# Patient Record
Sex: MALE | Race: WHITE | NOT HISPANIC OR LATINO | Employment: UNEMPLOYED | URBAN - METROPOLITAN AREA
[De-identification: names, ages, dates, MRNs, and addresses within clinical notes are randomized per-mention and may not be internally consistent; named-entity substitution may affect disease eponyms.]

---

## 2018-05-16 ENCOUNTER — RECORDS - HEALTHEAST (OUTPATIENT)
Dept: LAB | Facility: CLINIC | Age: 33
End: 2018-05-16

## 2018-05-19 LAB — BACTERIA SPEC CULT: NORMAL

## 2021-05-27 ENCOUNTER — RECORDS - HEALTHEAST (OUTPATIENT)
Dept: ADMINISTRATIVE | Facility: CLINIC | Age: 36
End: 2021-05-27

## 2021-06-16 PROBLEM — Z22.322 MRSA (METHICILLIN RESISTANT STAPH AUREUS) CULTURE POSITIVE: Status: ACTIVE | Noted: 2019-02-12

## 2022-04-30 ENCOUNTER — APPOINTMENT (OUTPATIENT)
Dept: CT IMAGING | Facility: HOSPITAL | Age: 37
End: 2022-04-30
Attending: EMERGENCY MEDICINE
Payer: COMMERCIAL

## 2022-04-30 ENCOUNTER — HOSPITAL ENCOUNTER (EMERGENCY)
Facility: HOSPITAL | Age: 37
Discharge: ANOTHER HOSPITAL | End: 2022-04-30
Attending: EMERGENCY MEDICINE | Admitting: EMERGENCY MEDICINE
Payer: COMMERCIAL

## 2022-04-30 VITALS
OXYGEN SATURATION: 96 % | RESPIRATION RATE: 16 BRPM | DIASTOLIC BLOOD PRESSURE: 86 MMHG | HEIGHT: 74 IN | WEIGHT: 225.09 LBS | HEART RATE: 68 BPM | SYSTOLIC BLOOD PRESSURE: 139 MMHG | TEMPERATURE: 98.1 F | BODY MASS INDEX: 28.89 KG/M2

## 2022-04-30 DIAGNOSIS — K04.7 DENTAL ABSCESS: ICD-10-CM

## 2022-04-30 DIAGNOSIS — L03.211 FACIAL CELLULITIS: ICD-10-CM

## 2022-04-30 LAB
ANION GAP SERPL CALCULATED.3IONS-SCNC: 11 MMOL/L (ref 5–18)
BASOPHILS # BLD AUTO: 0 10E3/UL (ref 0–0.2)
BASOPHILS NFR BLD AUTO: 0 %
BUN SERPL-MCNC: 6 MG/DL (ref 8–22)
CALCIUM SERPL-MCNC: 9.2 MG/DL (ref 8.5–10.5)
CHLORIDE BLD-SCNC: 103 MMOL/L (ref 98–107)
CO2 SERPL-SCNC: 26 MMOL/L (ref 22–31)
CREAT SERPL-MCNC: 0.9 MG/DL (ref 0.7–1.3)
EOSINOPHIL # BLD AUTO: 0.1 10E3/UL (ref 0–0.7)
EOSINOPHIL NFR BLD AUTO: 1 %
ERYTHROCYTE [DISTWIDTH] IN BLOOD BY AUTOMATED COUNT: 12.3 % (ref 10–15)
GFR SERPL CREATININE-BSD FRML MDRD: >90 ML/MIN/1.73M2
GLUCOSE BLD-MCNC: 155 MG/DL (ref 70–125)
HCT VFR BLD AUTO: 46.8 % (ref 40–53)
HGB BLD-MCNC: 15.6 G/DL (ref 13.3–17.7)
IMM GRANULOCYTES # BLD: 0 10E3/UL
IMM GRANULOCYTES NFR BLD: 0 %
LACTATE SERPL-SCNC: 1.3 MMOL/L (ref 0.7–2)
LYMPHOCYTES # BLD AUTO: 2.6 10E3/UL (ref 0.8–5.3)
LYMPHOCYTES NFR BLD AUTO: 28 %
MAGNESIUM SERPL-MCNC: 1.9 MG/DL (ref 1.8–2.6)
MCH RBC QN AUTO: 30.1 PG (ref 26.5–33)
MCHC RBC AUTO-ENTMCNC: 33.3 G/DL (ref 31.5–36.5)
MCV RBC AUTO: 90 FL (ref 78–100)
MONOCYTES # BLD AUTO: 0.8 10E3/UL (ref 0–1.3)
MONOCYTES NFR BLD AUTO: 8 %
NEUTROPHILS # BLD AUTO: 5.9 10E3/UL (ref 1.6–8.3)
NEUTROPHILS NFR BLD AUTO: 63 %
NRBC # BLD AUTO: 0 10E3/UL
NRBC BLD AUTO-RTO: 0 /100
PLATELET # BLD AUTO: 203 10E3/UL (ref 150–450)
POTASSIUM BLD-SCNC: 3.9 MMOL/L (ref 3.5–5)
RBC # BLD AUTO: 5.18 10E6/UL (ref 4.4–5.9)
SODIUM SERPL-SCNC: 140 MMOL/L (ref 136–145)
WBC # BLD AUTO: 9.4 10E3/UL (ref 4–11)

## 2022-04-30 PROCEDURE — 99285 EMERGENCY DEPT VISIT HI MDM: CPT | Mod: 25

## 2022-04-30 PROCEDURE — 250N000011 HC RX IP 250 OP 636: Performed by: EMERGENCY MEDICINE

## 2022-04-30 PROCEDURE — 96375 TX/PRO/DX INJ NEW DRUG ADDON: CPT | Mod: 59

## 2022-04-30 PROCEDURE — 99284 EMERGENCY DEPT VISIT MOD MDM: CPT | Mod: 25 | Performed by: EMERGENCY MEDICINE

## 2022-04-30 PROCEDURE — 99284 EMERGENCY DEPT VISIT MOD MDM: CPT | Performed by: EMERGENCY MEDICINE

## 2022-04-30 PROCEDURE — 83735 ASSAY OF MAGNESIUM: CPT | Performed by: EMERGENCY MEDICINE

## 2022-04-30 PROCEDURE — 96367 TX/PROPH/DG ADDL SEQ IV INF: CPT

## 2022-04-30 PROCEDURE — 85018 HEMOGLOBIN: CPT | Performed by: EMERGENCY MEDICINE

## 2022-04-30 PROCEDURE — 70487 CT MAXILLOFACIAL W/DYE: CPT

## 2022-04-30 PROCEDURE — 80048 BASIC METABOLIC PNL TOTAL CA: CPT | Performed by: EMERGENCY MEDICINE

## 2022-04-30 PROCEDURE — 258N000003 HC RX IP 258 OP 636: Performed by: EMERGENCY MEDICINE

## 2022-04-30 PROCEDURE — 96365 THER/PROPH/DIAG IV INF INIT: CPT | Mod: 59

## 2022-04-30 PROCEDURE — 41800 DRAINAGE OF GUM LESION: CPT | Performed by: EMERGENCY MEDICINE

## 2022-04-30 PROCEDURE — 36415 COLL VENOUS BLD VENIPUNCTURE: CPT | Performed by: EMERGENCY MEDICINE

## 2022-04-30 PROCEDURE — 83605 ASSAY OF LACTIC ACID: CPT | Performed by: EMERGENCY MEDICINE

## 2022-04-30 RX ORDER — MORPHINE SULFATE 4 MG/ML
4 INJECTION, SOLUTION INTRAMUSCULAR; INTRAVENOUS ONCE
Status: COMPLETED | OUTPATIENT
Start: 2022-04-30 | End: 2022-04-30

## 2022-04-30 RX ORDER — CLINDAMYCIN PHOSPHATE 900 MG/50ML
900 INJECTION, SOLUTION INTRAVENOUS ONCE
Status: COMPLETED | OUTPATIENT
Start: 2022-04-30 | End: 2022-04-30

## 2022-04-30 RX ORDER — CEFAZOLIN SODIUM 1 G/50ML
2000 SOLUTION INTRAVENOUS ONCE
Status: COMPLETED | OUTPATIENT
Start: 2022-04-30 | End: 2022-04-30

## 2022-04-30 RX ORDER — IOPAMIDOL 755 MG/ML
100 INJECTION, SOLUTION INTRAVASCULAR ONCE
Status: COMPLETED | OUTPATIENT
Start: 2022-04-30 | End: 2022-04-30

## 2022-04-30 RX ADMIN — MORPHINE SULFATE 4 MG: 4 INJECTION, SOLUTION INTRAMUSCULAR; INTRAVENOUS at 20:36

## 2022-04-30 RX ADMIN — VANCOMYCIN HYDROCHLORIDE 2000 MG: 5 INJECTION, POWDER, LYOPHILIZED, FOR SOLUTION INTRAVENOUS at 21:25

## 2022-04-30 RX ADMIN — CLINDAMYCIN IN 5 PERCENT DEXTROSE 900 MG: 18 INJECTION, SOLUTION INTRAVENOUS at 20:42

## 2022-04-30 RX ADMIN — IOPAMIDOL 100 ML: 755 INJECTION, SOLUTION INTRAVENOUS at 21:17

## 2022-05-01 ENCOUNTER — HOSPITAL ENCOUNTER (EMERGENCY)
Facility: CLINIC | Age: 37
Discharge: HOME | End: 2022-05-01
Attending: EMERGENCY MEDICINE | Admitting: EMERGENCY MEDICINE
Payer: COMMERCIAL

## 2022-05-01 VITALS
TEMPERATURE: 98.4 F | WEIGHT: 240 LBS | OXYGEN SATURATION: 96 % | HEIGHT: 75 IN | SYSTOLIC BLOOD PRESSURE: 146 MMHG | HEART RATE: 84 BPM | DIASTOLIC BLOOD PRESSURE: 86 MMHG | RESPIRATION RATE: 16 BRPM | BODY MASS INDEX: 29.84 KG/M2

## 2022-05-01 DIAGNOSIS — K04.7 DENTAL ABSCESS: ICD-10-CM

## 2022-05-01 PROCEDURE — 250N000013 HC RX MED GY IP 250 OP 250 PS 637: Performed by: EMERGENCY MEDICINE

## 2022-05-01 PROCEDURE — 250N000009 HC RX 250: Performed by: DENTIST

## 2022-05-01 RX ORDER — IBUPROFEN 800 MG/1
800 TABLET, FILM COATED ORAL EVERY 8 HOURS PRN
Qty: 20 TABLET | Refills: 0 | Status: SHIPPED | OUTPATIENT
Start: 2022-05-01 | End: 2022-05-09

## 2022-05-01 RX ORDER — LIDOCAINE HYDROCHLORIDE AND EPINEPHRINE 10; 10 MG/ML; UG/ML
10 INJECTION, SOLUTION INFILTRATION; PERINEURAL ONCE
Status: COMPLETED | OUTPATIENT
Start: 2022-05-01 | End: 2022-05-01

## 2022-05-01 RX ORDER — CHLORHEXIDINE GLUCONATE ORAL RINSE 1.2 MG/ML
15 SOLUTION DENTAL 3 TIMES DAILY
Qty: 473 ML | Refills: 0 | Status: SHIPPED | OUTPATIENT
Start: 2022-05-01 | End: 2022-05-08

## 2022-05-01 RX ORDER — OXYCODONE AND ACETAMINOPHEN 5; 325 MG/1; MG/1
1 TABLET ORAL EVERY 6 HOURS PRN
Qty: 12 TABLET | Refills: 0 | Status: SHIPPED | OUTPATIENT
Start: 2022-05-01 | End: 2022-05-04

## 2022-05-01 RX ORDER — LIDOCAINE HYDROCHLORIDE AND EPINEPHRINE 10; 10 MG/ML; UG/ML
15 INJECTION, SOLUTION INFILTRATION; PERINEURAL ONCE
Status: COMPLETED | OUTPATIENT
Start: 2022-05-01 | End: 2022-05-01

## 2022-05-01 RX ORDER — IBUPROFEN 800 MG/1
800 TABLET, FILM COATED ORAL ONCE
Status: COMPLETED | OUTPATIENT
Start: 2022-05-01 | End: 2022-05-01

## 2022-05-01 RX ORDER — CLINDAMYCIN HCL 300 MG
300 CAPSULE ORAL 4 TIMES DAILY
Qty: 28 CAPSULE | Refills: 0 | Status: SHIPPED | OUTPATIENT
Start: 2022-05-01 | End: 2022-05-08

## 2022-05-01 RX ORDER — OXYCODONE AND ACETAMINOPHEN 5; 325 MG/1; MG/1
2 TABLET ORAL ONCE
Status: COMPLETED | OUTPATIENT
Start: 2022-05-01 | End: 2022-05-01

## 2022-05-01 RX ADMIN — LIDOCAINE HYDROCHLORIDE AND EPINEPHRINE 10 ML: 10; 10 INJECTION, SOLUTION INFILTRATION; PERINEURAL at 02:36

## 2022-05-01 RX ADMIN — IBUPROFEN 800 MG: 800 TABLET, FILM COATED ORAL at 03:43

## 2022-05-01 RX ADMIN — OXYCODONE HYDROCHLORIDE AND ACETAMINOPHEN 2 TABLET: 5; 325 TABLET ORAL at 03:59

## 2022-05-01 RX ADMIN — LIDOCAINE HYDROCHLORIDE AND EPINEPHRINE 15 ML: 10; 10 INJECTION, SOLUTION INFILTRATION; PERINEURAL at 02:57

## 2022-05-01 ASSESSMENT — ENCOUNTER SYMPTOMS
NAUSEA: 0
SHORTNESS OF BREATH: 0
FACIAL SWELLING: 1
FEVER: 0
VOMITING: 0
WEAKNESS: 0
WOUND: 0

## 2022-05-01 NOTE — ED TRIAGE NOTES
Per pt. He was transferred from [place] due to infected upper left side tooth. Swelling started 2 days. Was seen at  yesterday, and placed on abx. Swelling got worse so went into ER today. Was given IV abs at [facility] and sent here.

## 2022-05-01 NOTE — PROCEDURES
OMFS PROCEDURE NOTE    Findings:  -Left facial swelling due to abscess of tooth #10.        Local anesthesia:   -15cc Lidocaine 1% with 1:100k epi      Procedure:   - Risk and benefit of the procedure discussed with the patient. Verbal consent was given. Gauze was placed to protect the oropharynx. A full thickness mucoperiosteal flap was elevated around tooth # 10,11,12. Drainage achieved. The site was irrigated with 60cc of sterile saline. Drain was placed and sutured to the gingiva using 3-0 gut suture. Moist gauze was placed to aid in hemostasis.     Sushant Valencia [facility] PGY-2  In OMS   Pager:[phone]

## 2022-05-01 NOTE — ED TRIAGE NOTES
Pt states for past 3 days he has been experiencing left-sided dental pain and facial pain.  He states he went to urgent care yesterday and was told he has an infection and he was prescribed Clindamycin.  Pt denies taking anything for the pain today.

## 2022-05-01 NOTE — DISCHARGE INSTRUCTIONS
Take complete course of clindamycin.  Take ibuprofen 800 mg every 8 hours with food.  Use Peridex rinses as directed.    Follow-up for tooth extraction on Monday afternoon at 2 PM.  Oral and Maxillofacial Surgery Clinic - [facility]  7th floor of [address] [address]  Clinic phone number: [phone]  Return to emergency department if fever, worsening symptoms, or other concerns.

## 2022-05-01 NOTE — ED PROVIDER NOTES
EMERGENCY DEPARTMENT ENCOUNTER      NAME: Carlos Villareal  AGE: 36 year old male  YOB: 1985  MRN: [id number]  EVALUATION DATE & TIME: 4/30/2022  7:52 PM    PCP: George Rockwell    ED PROVIDER: Migdalia Renee M.D.      Chief Complaint   Patient presents with     Dental Pain     Facial Pain     FINAL IMPRESSION:  1. Dental abscess    2. Facial cellulitis      ED COURSE & MEDICAL DECISION MAKING:    Pertinent Labs & Imaging studies reviewed. (See chart for details)  ED Course as of 04/30/22 2225   Sat Apr 30, 2022 2023 Patient is a pleasant 36-year-old male who comes in today for evaluation of swelling to his face and likely a dental abscess.  It sounds like he was seen in emergency room yesterday and they drained a dental abscess and put him on clindamycin because he is a penicillin allergy.  He reports worsening symptoms since then.  He does have quite a lot of discomfort and he does have some swelling to the left side of his face.  I not feel an obvious abscess.  He does have a broken tooth #10 which I suspect is where the abscess started.  We will get some basic blood work and CT of his face to further evaluate.  I Cat order some clindamycin and vancomycin and some IV morphine for him as well.  I discussed this with him and he is in agreement with the plan.   2107 Patient's lactate is unremarkable.  His creatinine is normal so I made him ready for CT imaging.   2127 Patient's lab work looks quite good.  White counts not elevated.  CT imaging is pending at this time.  He is getting a dose of IV antibiotics now while we wait.   2152 Patient does have an abscess on his CT scan around tooth 10 which is the tooth that is broken.  I will put a call out to oral surgery and see if they can look at the imaging to help me facilitate evaluation by them versus close follow-up.  His lab work really looks good.   2207 I spoke with the oral surgeon who recommended that the patient come ED to ED and  then the resident can come down and do another abscess drainage and leaving a drain.  It sounded more complex than something I would be able to do.   2210 I spoke with Dr. Lauhglin who accepted the patient on the ED to ED transfer.  I discussed the plan with the patient.  He is in agreement.  He is on a call for a ride to get over there.  They may end up adding on Flagyl but I will let them decide once they see him.       At the conclusion of the encounter I discussed  the results of all of the tests and the disposition with patient.   All questions were answered.  The patient acknowledged understanding and was involved in the decision making regarding the overall care plan.      I discussed with patient the utility, limitations and findings of the exam/interventions/studies done during this visit as well as the list of differential diagnosis and symptoms to monitor/return to ER for.  Additional verbal discharge instructions were provided.     MEDICATIONS GIVEN IN THE EMERGENCY:  Medications   clindamycin (CLEOCIN) infusion 900 mg (0 mg Intravenous Stopped 4/30/22 2112)   morphine (PF) injection 4 mg (4 mg Intravenous Given 4/30/22 2036)   vancomycin (VANCOCIN) 2,000 mg in sodium chloride 0.9 % 500 mL intermittent infusion (0 mg Intravenous Stopped 4/30/22 2214)   iopamidol (ISOVUE-370) solution 100 mL (100 mLs Intravenous Given 4/30/22 2117)       NEW PRESCRIPTIONS STARTED AT TODAY'S ER VISIT  New Prescriptions    No medications on file          =================================================================    HPI    Triage Note: Pt states for past 3 days he has been experiencing left-sided dental pain and facial pain.  He states he went to urgent care yesterday and was told he has an infection and he was prescribed Clindamycin.  Pt denies taking anything for the pain today.    Patient information was obtained from: Patient    Use of : N/A       Carlos Villareal is a 36 year old male who presents by  "walk in for evaluation of facial pain.    Patient reports 2 days of worsening left sided facial pain. Patient says he was prescribed Clindamycin from urgent care but says they have not helped at all. Patient also notes that they drained fluid from his face at urgent care. Patient denies vision changes or any other concerns at this time.     REVIEW OF SYSTEMS   Except as stated in the HPI all other systems reviewed and are negative.    PAST MEDICAL HISTORY:  No past medical history on file.    PAST SURGICAL HISTORY:  Past Surgical History:   Procedure Laterality Date     SKIN GRAFT         CURRENT MEDICATIONS:    No current facility-administered medications for this encounter.    Current Outpatient Medications:      cyclobenzaprine (FLEXERIL) 10 MG tablet, [CYCLOBENZAPRINE (FLEXERIL) 10 MG TABLET] Take 1 tablet (10 mg total) by mouth 2 (two) times a day as needed for muscle spasms., Disp: 20 tablet, Rfl: 0     gabapentin (NEURONTIN) 300 MG capsule, [GABAPENTIN (NEURONTIN) 300 MG CAPSULE] Take 1 capsule (300 mg total) by mouth 3 (three) times a day., Disp: 10 capsule, Rfl: 0    ALLERGIES:  Allergies   Allergen Reactions     Penicillins Nausea and Vomiting       FAMILY HISTORY:  No family history on file.    SOCIAL HISTORY:   Social History     Socioeconomic History     Marital status:    Tobacco Use     Smoking status: Current Every Day Smoker     Packs/day: 0.50     Types: Cigarettes, Cigarettes     Smokeless tobacco: Never Used   Substance and Sexual Activity     Alcohol use: Yes     Drug use: Never       PHYSICAL EXAM    VITAL SIGNS: /86   Pulse 68   Temp 98.1  F (36.7  C) (Oral)   Resp 16   Ht 1.88 m (6' 2\")   Wt 102.1 kg (225 lb 1.4 oz)   SpO2 96%   BMI 28.90 kg/m     GENERAL: Awake, Alert, answering questions, Well nourished. Appears uncomfortable.  HEENT: Normal cephalic, Atraumatic, bilateral external ears normal, No scleral icterus, mask in place. Swelling over left side of face. " Tenderness over left lower cheek. No conjunctiva. Extraocular movement intact.  Broken tooth #10 with significant tenderness  NECK: No obvious swelling or abnormality, No stridor  PULMONARY:Normal and symmetric breath sounds, No respiratory distress, Lungs clear to auscultation bilaterally. No wheezing  CARDIOVASCULAR: Regular rate and rhythm, Distal pulses present and normal.  ABDOMINAL: Soft, Nondistended, Nontender, No flank tenderness, No palpable masses  BACK: No bruising or tenderness.  EXTREMITIES: Moves all extremities spontaneously, warm, no edema, No major deformities  NEURO: No facial droop, normal motor function, Normal speech   PSYCH: Normal mood and affect  SKIN: No rashes on visualized skin, dry, warm     LAB:  All pertinent labs reviewed and interpreted.  Results for orders placed or performed during the hospital encounter of 04/30/22   CT Facial Bones with Contrast    Impression    IMPRESSION:   1.  Soft tissue swelling and inflammatory changes overlying left cheek.  2.  Underlying lucency surrounding roots of the tooth #10 which also demonstrates significant carious.  3.  In addition, there is defect at the bone surrounding the root at the outer aspect of maxilla. Associated fluid and gas collection overlying the defect; measured at 8 x 13 x 16 mm.  4.  Findings most consistent with an inflammatory/infectious process of dentigerous origin and abscess formation.   Basic metabolic panel   Result Value Ref Range    Sodium 140 136 - 145 mmol/L    Potassium 3.9 3.5 - 5.0 mmol/L    Chloride 103 98 - 107 mmol/L    Carbon Dioxide (CO2) 26 22 - 31 mmol/L    Anion Gap 11 5 - 18 mmol/L    Urea Nitrogen 6 (L) 8 - 22 mg/dL    Creatinine 0.90 0.70 - 1.30 mg/dL    Calcium 9.2 8.5 - 10.5 mg/dL    Glucose 155 (H) 70 - 125 mg/dL    GFR Estimate >90 >60 mL/min/1.73m2   Result Value Ref Range    Magnesium 1.9 1.8 - 2.6 mg/dL   Lactic acid whole blood   Result Value Ref Range    Lactic Acid 1.3 0.7 - 2.0 mmol/L   CBC  with platelets and differential   Result Value Ref Range    WBC Count 9.4 4.0 - 11.0 10e3/uL    RBC Count 5.18 4.40 - 5.90 10e6/uL    Hemoglobin 15.6 13.3 - 17.7 g/dL    Hematocrit 46.8 40.0 - 53.0 %    MCV 90 78 - 100 fL    MCH 30.1 26.5 - 33.0 pg    MCHC 33.3 31.5 - 36.5 g/dL    RDW 12.3 10.0 - 15.0 %    Platelet Count 203 150 - 450 10e3/uL    % Neutrophils 63 %    % Lymphocytes 28 %    % Monocytes 8 %    % Eosinophils 1 %    % Basophils 0 %    % Immature Granulocytes 0 %    NRBCs per 100 WBC 0 <1 /100    Absolute Neutrophils 5.9 1.6 - 8.3 10e3/uL    Absolute Lymphocytes 2.6 0.8 - 5.3 10e3/uL    Absolute Monocytes 0.8 0.0 - 1.3 10e3/uL    Absolute Eosinophils 0.1 0.0 - 0.7 10e3/uL    Absolute Basophils 0.0 0.0 - 0.2 10e3/uL    Absolute Immature Granulocytes 0.0 <=0.4 10e3/uL    Absolute NRBCs 0.0 10e3/uL       RADIOLOGY:  CT Facial Bones with Contrast   Final Result   IMPRESSION:    1.  Soft tissue swelling and inflammatory changes overlying left cheek.   2.  Underlying lucency surrounding roots of the tooth #10 which also demonstrates significant carious.   3.  In addition, there is defect at the bone surrounding the root at the outer aspect of maxilla. Associated fluid and gas collection overlying the defect; measured at 8 x 13 x 16 mm.   4.  Findings most consistent with an inflammatory/infectious process of dentigerous origin and abscess formation.            I, Yoan Cooper, am serving as a scribe to document services personally performed by Dr. Renee based on my observation and the provider's statements to me. I, Migdalia Renee MD attest that Yoan Cooper is acting in a scribe capacity, has observed my performance of the services and has documented them in accordance with my direction.    Migdalia Renee M.D.  Emergency Medicine  [facility] EMERGENCY DEPARTMENT  [address] [place] [id number] [phone]  Dept: [phone]      Thelma  Migdalia Holt MD  04/30/22 4841

## 2022-05-01 NOTE — CONSULTS
ORAL & MAXILLOFACIAL SURGERY   CONSULT  Carlos Villareal,  MRN: [id number],  : 1985        ASSESSMENT   36 year old male present with periapical abscess of tooth #10 that spread to the left canine space. The patient is s/p failed I&D attempt 1 days ago. The swelling is indicated for I&D and later extraction of tooth #10.       PLAN  - Bedside I&D under local anesthesia   - Recommend oral clindamycin 300 mg q6h for 7 days   - Recommend chlorhexidine 0.12% (Peridex) mouth rinse TID for 7 days   - Pain medication per ED physician   - The patient to return to [facility] OMS clinic for follow-up, drain removal, and extraction of tooth #10 on 2022 at 2pm     Oral and Maxillofacial Surgery Clinic - [facility]  7th floor of [address] [place] [address]  Clinic phone number: [phone]  Clinic fax number: [phone]         Please contact the OMFS resident on-call with questions or concerns.    Discussed with chief resident and staff.    Sushant Valencia DMD  [facility] PGY-2 in OMS     ____________________________________________      HPI  36 year old male have a history of chronic painful tooth #10. The patient noticed that his cheek and his eye started swelling about 3 days ago. The patient went to an urgent care clinic. The patient's dentist attempted to perform I&D using syringe. The patient reported that there was white discharge that came out though the syringe. However, the swelling has not improved. The patient presented at [facility] ED and was transferred to [place] ED.    HISTORY  Past Medical History: No past medical history on file.  Past Surgical History:   Past Surgical History:   Procedure Laterality Date     SKIN GRAFT       Medications:   [COMPLETED] clindamycin (CLEOCIN) infusion 900 mg  [COMPLETED] iopamidol (ISOVUE-370) solution 100 mL  [COMPLETED] morphine (PF) injection 4 mg  [COMPLETED] vancomycin (VANCOCIN) 2,000 mg in sodium  "chloride 0.9 % 500 mL intermittent infusion    cyclobenzaprine (FLEXERIL) 10 MG tablet, [CYCLOBENZAPRINE (FLEXERIL) 10 MG TABLET] Take 1 tablet (10 mg total) by mouth 2 (two) times a day as needed for muscle spasms.  gabapentin (NEURONTIN) 300 MG capsule, [GABAPENTIN (NEURONTIN) 300 MG CAPSULE] Take 1 capsule (300 mg total) by mouth 3 (three) times a day.         lidocaine 1% with EPINEPHrine 1:100,000  15 mL Other Once     Allergies:   Allergies   Allergen Reactions     Penicillins Nausea and Vomiting     Social History:   Social History     Socioeconomic History     Marital status:      Spouse name: Not on file     Number of children: Not on file     Years of education: Not on file     Highest education level: Not on file   Occupational History     Not on file   Tobacco Use     Smoking status: Current Every Day Smoker     Packs/day: 0.50     Types: Cigarettes, Cigarettes     Smokeless tobacco: Never Used   Substance and Sexual Activity     Alcohol use: Yes     Drug use: Never     Sexual activity: Not on file   Other Topics Concern     Not on file   Social History Narrative     Not on file     Social Determinants of Health     Financial Resource Strain: Not on file   Food Insecurity: Not on file   Transportation Needs: Not on file   Physical Activity: Not on file   Stress: Not on file   Social Connections: Not on file   Intimate Partner Violence: Not on file   Housing Stability: Not on file       REVIEW OF SYSTEMS  10 point ROS reviewed and negative aside from listed in HPI    PHYSICAL EXAM  Vital Signs:   Vitals:    04/30/22 2352   BP: (!) 146/86   Pulse: 84   Resp: 16   Temp: 98.4  F (36.9  C)   TempSrc: Oral   SpO2: 96%   Weight: 108.9 kg (240 lb)   Height: 1.905 m (6' 3\")       GEN: WD/WN male, NAD  HEENT: NC/AT, EOMI, PERRL       Extraoral exam: NC/AT, EOMI, PERRL, left cheek swelling but not extending to the left lower eyelid. No submandibular lymphadenopathy, no induration or erythema,  inferior " border of mandible is palpable bilaterally,  No clicking of TMJ on opening and lateral movements.     Intraoral exam: NELLIE >35mm, OP clear, uvula midline, poor oral hygiene, buccal vestibular edema at site #10,11,12. Tooth #10 has gross carious lesion. Tooth #10 is painful upon palpation and percussion.  FOM ND/NT, no erythema/ulcerations/pigmentations/exophytic lesions.    CV: RRR, no M/G/R, warm and well-perfused  PULM: CTAB, breathing comfortably on room air  GI: Soft, ND/NT  MSK: CORONA, no peripheral extremity edema  NEURO: AAOx4, CN II-XII intact bilaterally  PSYCH: Appropriate mood and affect            REVIEW OF LABORATORY DATA  Most Recent 3 CBC's:  Recent Labs   Lab Test 04/30/22 2035   WBC 9.4   HGB 15.6   MCV 90         Most Recent 3 BMP's:  Recent Labs   Lab Test 04/30/22 2035      POTASSIUM 3.9   CHLORIDE 103   CO2 26   BUN 6*   CR 0.90   ANIONGAP 11   ISAAC 9.2   *     Most Recent 2 LFT's:No lab results found.  Most Recent INR's and Anticoagulation Dosing History:  Anticoagulation Dose History    There is no flowsheet data to display.       Most Recent 3 Troponin's:No lab results found.  Most Recent Cholesterol Panel:No lab results found.  Most Recent 6 Bacteria Isolates From Any Culture (See EPIC Reports for Culture Details):No lab results found.  Most Recent TSH, T4 and A1c Labs:No lab results found.    IMAGING RESULTS (Include outside hospital results)     Facial CT with contrast independently reviewed.  Condyles seated in the glenoid fossae bilaterally, maxillary sinuses clear bilaterally, inferior border of mandible with no steps or defects. Gross carious tooth #10. PARL surrounding roots of the tooth #10. Collection overlying the defect measured at 8 x 13 x 16 mm.

## 2022-05-01 NOTE — PHARMACY-VANCOMYCIN DOSING SERVICE
Pharmacy Vancomycin Initial Note  Date of Service 2022  Patient's  1985  36 year old, male    Indication: Skin and Soft Tissue Infection. Patient has a dental infection and has been taking Clindamycin. He had fluid drained from face at urgent care. Clindamycin 900mg IV x1 ordered in ED.     Current estimated CrCl = CrCl cannot be calculated (No successful lab value found.).    Creatinine for last 3 days  No results found for requested labs within last 72 hours.    Recent Vancomycin Level(s) for last 3 days  No results found for requested labs within last 72 hours.      Vancomycin IV Administrations (past 72 hours)      No vancomycin orders with administrations in past 72 hours.                Nephrotoxins and other renal medications (From now, onward)    Start     Dose/Rate Route Frequency Ordered Stop    22  vancomycin (VANCOCIN) 2,000 mg in sodium chloride 0.9 % 500 mL intermittent infusion         2,000 mg  over 2 Hours Intravenous ONCE 22            Contrast Orders - past 72 hours (72h ago, onward)    None             Plan:  1. Start vancomycin  2000 mg IV once (19.6 mg/kg) loading dose  2. Please consult again if continued on the floor.     Laney Méndez, [facility]

## 2022-05-01 NOTE — ED PROVIDER NOTES
ED Provider Note  [facility]      History     Chief Complaint   Patient presents with     Dental Pain     HPI  Carlos Villareal is a 36 year old male with a PMH of cellulitis and abscess of multiple sites, MRSA infection and H/O COVID-19 infection who presents to the ED today with reports of facial cellulitis and dental abscess.  He was referred from [facility] emergency department for dental consultation after dental abscess was found on CT imaging.    Patient was seen earlier today at [facility] ED with reports of facial swelling.  He was seen in another ED the day prior and they drained a dental abscess, put him on clindamycin because he is allergic to penicillin.  He reported worsening symptoms since then.  He has a broken tooth #10 which is suspect for the abscess origin.  Patient was also provided with clindamycin, vancomycin and IV morphine.    EXAM: CT FACIAL BONES WITH CONTRAST  LOCATION: [facility]  DATE/TIME: 4/30/2022 9:16 PM  INDICATION: Left-sided facial infection  COMPARISON: None.                                                 IMPRESSION:   1.  Soft tissue swelling and inflammatory changes overlying left cheek.  2.  Underlying lucency surrounding roots of the tooth #10 which also demonstrates significant carious.  3.  In addition, there is defect at the bone surrounding the root at the outer aspect of maxilla. Associated fluid and gas collection overlying the defect; measured at 8 x 13 x 16 mm.  4.  Findings most consistent with an inflammatory/infectious process of dentigerous origin and abscess formation.    Past Medical History  No past medical history on file.  Past Surgical History:   Procedure Laterality Date     SKIN GRAFT       chlorhexidine (PERIDEX) 0.12 % solution  clindamycin (CLEOCIN) 300 MG capsule  ibuprofen (ADVIL/MOTRIN) 800 MG tablet  oxyCODONE-acetaminophen (PERCOCET) 5-325 MG tablet  cyclobenzaprine (FLEXERIL) 10 MG  "tablet  gabapentin (NEURONTIN) 300 MG capsule      Allergies   Allergen Reactions     Penicillins Nausea and Vomiting     Family History  No family history on file.  Social History   Social History     Tobacco Use     Smoking status: Current Every Day Smoker     Packs/day: 0.50     Types: Cigarettes, Cigarettes     Smokeless tobacco: Never Used   Substance Use Topics     Alcohol use: Yes     Drug use: Never      Past medical history, past surgical history, medications, allergies, family history, and social history were reviewed with the patient. No additional pertinent items.       Review of Systems   Constitutional: Negative for fever.   HENT: Positive for dental problem and facial swelling.    Respiratory: Negative for shortness of breath.    Cardiovascular: Negative for chest pain.   Gastrointestinal: Negative for nausea and vomiting.   Skin: Negative for wound.   Neurological: Negative for weakness.   All other systems reviewed and are negative.    A complete review of systems was performed with pertinent positives and negatives noted in the HPI, and all other systems negative.    Physical Exam   BP: (!) 146/86  Pulse: 84  Temp: 98.4  F (36.9  C)  Resp: 16  Height: 190.5 cm (6' 3\")  Weight: 108.9 kg (240 lb)  SpO2: 96 %  Physical Exam  Vitals and nursing note reviewed.   Constitutional:       General: He is in acute distress.   HENT:      Head:        Nose: Nose normal.      Mouth/Throat:      Mouth: Mucous membranes are moist.   Eyes:      Pupils: Pupils are equal, round, and reactive to light.   Cardiovascular:      Rate and Rhythm: Normal rate.   Pulmonary:      Effort: No respiratory distress.   Skin:     General: Skin is warm and dry.   Neurological:      General: No focal deficit present.      Mental Status: He is alert.         ED Course      Procedures       Seen by OMFS.  I&D performed.  See consult note for complete details.  Plan discharge home on clindamycin.  Ibuprofen and Percocet prescribed for " pain.  The same were given here in the emergency department for pain control.              No results found for any visits on 05/01/22.  Medications   lidocaine 1% with EPINEPHrine 1:100,000 injection 10 mL (10 mLs Other Given by Other 5/1/22 0236)   lidocaine 1% with EPINEPHrine 1:100,000 injection 15 mL (15 mLs Other Given by Other 5/1/22 0257)   ibuprofen (ADVIL/MOTRIN) tablet 800 mg (800 mg Oral Given 5/1/22 5343)   oxyCODONE-acetaminophen (PERCOCET) 5-325 MG per tablet 2 tablet (2 tablets Oral Given 5/1/22 6942)        Assessments & Plan (with Medical Decision Making)   36 year old male to the emergency department with dental abscess.  He underwent I&D by [facility].  Patient given ibuprofen and Percocet here in the emergency department.  He received clindamycin and vancomycin at the referring hospital.  Patient be discharged home on clindamycin as well as Peridex rinses.  Ibuprofen and Percocet prescribed for pain.  He will follow-up with the [facility] clinic on Monday afternoon for extraction.    I have reviewed the nursing notes. I have reviewed the findings, diagnosis, plan and need for follow up with the patient.    Discharge Medication List as of 5/1/2022  4:04 AM      START taking these medications    Details   chlorhexidine (PERIDEX) 0.12 % solution Swish and spit 15 mLs in mouth 3 times daily for 7 days, Disp-473 mL, R-0, E-Prescribe      clindamycin (CLEOCIN) 300 MG capsule Take 1 capsule (300 mg) by mouth 4 times daily for 7 days, Disp-28 capsule, R-0, E-Prescribe      ibuprofen (ADVIL/MOTRIN) 800 MG tablet Take 1 tablet (800 mg) by mouth every 8 hours as needed for moderate pain, Disp-20 tablet, R-0, E-Prescribe      oxyCODONE-acetaminophen (PERCOCET) 5-325 MG tablet Take 1 tablet by mouth every 6 hours as needed for pain, Disp-12 tablet, R-0, E-Prescribe             Final diagnoses:   Dental abscess     Chart documentation was completed with Dragon voice-recognition software. Even though reviewed, this chart  may still contain some grammatical, spelling, and word errors.     --    [facility] EMERGENCY DEPARTMENT  4/30/2022     Otis Rivera MD  05/01/22 0513

## 2022-05-04 ENCOUNTER — NURSE TRIAGE (OUTPATIENT)
Dept: NURSING | Facility: CLINIC | Age: 37
End: 2022-05-04
Payer: COMMERCIAL

## 2022-05-05 NOTE — TELEPHONE ENCOUNTER
Patient ok'd to speak to mom about his health.   He is cold and clammy, no fever.  Face is swelling on the left side.  Patient did feel nauseous, but feels better now.  Patient states pain is 3/10.  Patient can breath and can swallow.  Patient is taking his Ibuprofen and antibiotics.  Patient is not taking the narcotics, hasn't needed them.  Mom stated she is concerned with swelling.    Plan is for patient to continue to take his prescribed medications.  Reminder to take Ibuprofen with food.  Try some ice on that cheek to see if it will help the swelling.  Call clinic in am if swelling isn't better.  [phone]    Darlin Gardner RN   05/05/22 12:21 AM  [facility] Nurse Advisor    Reason for Disposition    [1] Mild facial swelling (puffiness) AND [2] persists > 3 days    Additional Information    Negative: [1] Life-threatening reaction (anaphylaxis) in the past to similar substance (e.g., food, insect bite/sting, chemical, etc.) AND [2] < 2 hours since exposure    Negative: Unresponsive, passed out or very weak    Negative: Swollen tongue    Negative: Difficulty breathing or wheezing    Negative: Sounds like a life-threatening emergency to the triager    Negative: Followed a face injury    Negative: [1] Bee sting AND [2] within last 24 hours    Negative: Insect bite suspected    Negative: Swelling mainly of lip(s)    Negative: Swelling mainly around the eyes    Negative: [1] SEVERE swelling of entire face AND [2] < 2 hours since exposure to high-risk allergen (e.g., peanuts, tree nuts, fish, shellfish or 1st dose of drug) AND [3] no serious symptoms AND [4] no serious allergic reaction in the past    Negative: Fever    Negative: Taking an ACE Inhibitor medication  (e.g., benazepril/LOTENSIN, captopril/CAPOTEN, enalapril/VASOTEC, lisinopril/ZESTRIL)    Negative: Patient sounds very sick or weak to the triager    Negative: Pregnant > 20 weeks    Negative: Postpartum (i.e. < 1 month since delivery)     "Negative: SEVERE swelling of the entire face    Negative: [1] Swelling is red AND [2] very painful to touch    Negative: Swelling began after taking a drug    Negative: [1] Looks infected AND [2] large red area (> 2 in. or 5 cm)    Negative: [1] Painful rash AND [2] multiple small blisters grouped together (i.e., dermatomal distribution or \"band\" or \"stripe\")    Negative: Toothache    Negative: Swelling of both lower legs (i.e., bilateral pedal edema)    Negative: Widespread rash on body    Negative: Swelling is painful to touch    Negative: Looks like a boil or infected sore    Protocols used: FACE SWELLING-A-AH      "

## 2023-07-31 ENCOUNTER — DOCUMENTATION ONLY (OUTPATIENT)
Dept: OTHER | Facility: CLINIC | Age: 38
End: 2023-07-31

## 2023-07-31 ENCOUNTER — HOSPITAL ENCOUNTER (EMERGENCY)
Facility: HOSPITAL | Age: 38
Discharge: HOME | End: 2023-07-31
Attending: EMERGENCY MEDICINE | Admitting: EMERGENCY MEDICINE
Payer: COMMERCIAL

## 2023-07-31 VITALS
WEIGHT: 240 LBS | HEART RATE: 79 BPM | TEMPERATURE: 98.1 F | DIASTOLIC BLOOD PRESSURE: 69 MMHG | OXYGEN SATURATION: 95 % | HEIGHT: 74 IN | BODY MASS INDEX: 30.8 KG/M2 | SYSTOLIC BLOOD PRESSURE: 138 MMHG | RESPIRATION RATE: 12 BRPM

## 2023-07-31 DIAGNOSIS — R52 BODY ACHES: ICD-10-CM

## 2023-07-31 DIAGNOSIS — R51.9 ACUTE NONINTRACTABLE HEADACHE, UNSPECIFIED HEADACHE TYPE: ICD-10-CM

## 2023-07-31 LAB
FLUAV RNA SPEC QL NAA+PROBE: NEGATIVE
FLUBV RNA RESP QL NAA+PROBE: NEGATIVE
GROUP A STREP BY PCR: NOT DETECTED
HOLD SPECIMEN: NORMAL
RSV RNA SPEC NAA+PROBE: NEGATIVE
SARS-COV-2 RNA RESP QL NAA+PROBE: NEGATIVE

## 2023-07-31 PROCEDURE — 96375 TX/PRO/DX INJ NEW DRUG ADDON: CPT

## 2023-07-31 PROCEDURE — 87651 STREP A DNA AMP PROBE: CPT | Performed by: EMERGENCY MEDICINE

## 2023-07-31 PROCEDURE — 96361 HYDRATE IV INFUSION ADD-ON: CPT

## 2023-07-31 PROCEDURE — 250N000011 HC RX IP 250 OP 636: Performed by: EMERGENCY MEDICINE

## 2023-07-31 PROCEDURE — 258N000003 HC RX IP 258 OP 636: Performed by: EMERGENCY MEDICINE

## 2023-07-31 PROCEDURE — 99284 EMERGENCY DEPT VISIT MOD MDM: CPT | Mod: 25

## 2023-07-31 PROCEDURE — 96374 THER/PROPH/DIAG INJ IV PUSH: CPT

## 2023-07-31 PROCEDURE — 87637 SARSCOV2&INF A&B&RSV AMP PRB: CPT | Performed by: EMERGENCY MEDICINE

## 2023-07-31 RX ORDER — METOCLOPRAMIDE HYDROCHLORIDE 5 MG/ML
10 INJECTION INTRAMUSCULAR; INTRAVENOUS ONCE
Status: COMPLETED | OUTPATIENT
Start: 2023-07-31 | End: 2023-07-31

## 2023-07-31 RX ORDER — DIPHENHYDRAMINE HYDROCHLORIDE 50 MG/ML
25 INJECTION INTRAMUSCULAR; INTRAVENOUS ONCE
Status: COMPLETED | OUTPATIENT
Start: 2023-07-31 | End: 2023-07-31

## 2023-07-31 RX ORDER — ACETAMINOPHEN 500 MG
1000 TABLET ORAL 3 TIMES DAILY
Qty: 30 TABLET | Refills: 0 | Status: SHIPPED | OUTPATIENT
Start: 2023-07-31

## 2023-07-31 RX ORDER — KETOROLAC TROMETHAMINE 15 MG/ML
15 INJECTION, SOLUTION INTRAMUSCULAR; INTRAVENOUS ONCE
Status: COMPLETED | OUTPATIENT
Start: 2023-07-31 | End: 2023-07-31

## 2023-07-31 RX ORDER — IBUPROFEN 600 MG/1
600 TABLET, FILM COATED ORAL EVERY 6 HOURS PRN
Qty: 30 TABLET | Refills: 0 | Status: SHIPPED | OUTPATIENT
Start: 2023-07-31

## 2023-07-31 RX ADMIN — DIPHENHYDRAMINE HYDROCHLORIDE 25 MG: 50 INJECTION, SOLUTION INTRAMUSCULAR; INTRAVENOUS at 08:52

## 2023-07-31 RX ADMIN — SODIUM CHLORIDE 1000 ML: 9 INJECTION, SOLUTION INTRAVENOUS at 08:55

## 2023-07-31 RX ADMIN — METOCLOPRAMIDE 10 MG: 5 INJECTION, SOLUTION INTRAMUSCULAR; INTRAVENOUS at 08:58

## 2023-07-31 RX ADMIN — KETOROLAC TROMETHAMINE 15 MG: 15 INJECTION INTRAMUSCULAR; INTRAVENOUS at 08:49

## 2023-07-31 ASSESSMENT — ACTIVITIES OF DAILY LIVING (ADL): ADLS_ACUITY_SCORE: 35

## 2023-07-31 NOTE — ED TRIAGE NOTES
Pt has had a sore throat, body aches,fever and ha  for the last 3 days. Has not taken any OTC meds for this. No nausea or vomiting.

## 2023-07-31 NOTE — DISCHARGE INSTRUCTIONS
I recommend taking 600 mg of ibuprofen 3 times a day.  You can also take 1000 mg of Tylenol 3 times a day.  It is okay to take these at the same time.  After 2 days, please take the medication only as needed.    Drink plenty of fluids.    If your headache becomes a lot worse, your neck becomes too stiff to move, or you cannot keep down any fluids, please come back to the emergency department immediately for reevaluation.  These could be signs of bacterial meningitis which is an emergency.

## 2023-07-31 NOTE — ED PROVIDER NOTES
EMERGENCY DEPARTMENT ENCOUNTER      NAME: Carlos Villareal  AGE: 38 year old male  YOB: 1985  MRN: [id number]  EVALUATION DATE & TIME: 7/31/2023  8:00 AM    PCP: George Rockwell    ED PROVIDER: John Huggins M.D.      Chief Complaint   Patient presents with    Fever         FINAL IMPRESSION:  1. Body aches    2. Acute nonintractable headache, unspecified headache type          ED COURSE & MEDICAL DECISION MAKING:    Pertinent Labs & Imaging studies reviewed below.  All EKGs below represent my independent interpretation.   ED Course as of 07/31/23 1423   Mon Jul 31, 2023   0852 Patient is a 38-year-old woman who presents with couple days of body aches, fever, now with sore throat getting worse this evening.  He is uncomfortable on exam, has headache, but no signs of acute bacterial meningitis (afebrile, able to range neck without change in headache pattern, alert and oriented).  No significant tonsillar swelling but there is erythema.  Plan for strep, COVID, influenza, give IV fluids, antiemetics, and migraine cocktail.   0922 Symptomatic Influenza A/B, RSV, & SARS-CoV2 PCR (COVID-19) Nose  Negative viral panel   0922 Strep Group A PCR: Not Detected   0929 Rechecked the patient, he is feeling improved.  He has normal vital signs here, nontoxic-appearing.  We discussed signs of bacterial meningitis, thanks to keep an eye out for.  discharged with Tylenol, ibuprofen.       Additional ED Course Timestamps:  8:15 AM I met with the patient, obtained history, performed an initial exam, and discussed options and plan for diagnostics and treatment here in the ED.    9:34 AM Updated the patient about lab results.     Medical Decision Making    History:  Supplemental history from: Documented in chart, if applicable  External Record(s) reviewed: Documented in chart, if applicable.    Work Up:  Chart documentation includes differential considered and any EKGs or imaging independently interpreted by  provider, where specified.  In additional to work up documented, I considered the following work up: Documented in chart, if applicable.    External consultation:  Discussion of management with another provider: Documented in chart, if applicable    Complicating factors:  Care impacted by chronic illness: Chronic Pain  Care affected by social determinants of health: N/A    Disposition considerations: Discharge. I prescribed additional prescription strength medication(s) as charted. N/A.        At the conclusion of the encounter I discussed the results of all of the tests and the disposition. The questions were answered. The patient or family acknowledged understanding and was agreeable with the care plan.       MEDICATIONS GIVEN IN THE EMERGENCY:  Medications   ketorolac (TORADOL) injection 15 mg (15 mg Intravenous $Given 7/31/23 0849)   0.9% sodium chloride BOLUS (0 mLs Intravenous Stopped 7/31/23 0935)   diphenhydrAMINE (BENADRYL) injection 25 mg (25 mg Intravenous $Given 7/31/23 0852)   metoclopramide (REGLAN) injection 10 mg (10 mg Intravenous $Given 7/31/23 0858)         NEW PRESCRIPTIONS STARTED AT TODAY'S ER VISIT  Discharge Medication List as of 7/31/2023  9:35 AM        START taking these medications    Details   acetaminophen (TYLENOL) 500 MG tablet Take 2 tablets (1,000 mg) by mouth 3 times daily, Disp-30 tablet, R-0, E-Prescribe      ibuprofen (ADVIL/MOTRIN) 600 MG tablet Take 1 tablet (600 mg) by mouth every 6 hours as needed for moderate pain, Disp-30 tablet, R-0, E-Prescribe                =================================================================    HPI    Patient information was obtained from: Patient     Use of : N/A       Carlos Villareal is a 38 year old male with a pertinent history of chronic pain, MRSA, who presents to this ED for evaluation of fever.     The patient started experiencing sore throat, fever, body aches and headache for 3 days. The sore throat is worse on the  "left side and causes some trouble swallowing. The headache is located to the forehead bilaterally and back of the neck. The patient has associated photophobia.  He has not taken any over the counter medication. Denies any recent sick contact. He denies any cough, nausea, vomiting, diarrhea, abdominal pain, or any other medical concerns.        VITALS:  /69   Pulse 79   Temp 98.1  F (36.7  C) (Oral)   Resp 12   Ht 1.88 m (6' 2\")   Wt 108.9 kg (240 lb)   SpO2 95%   BMI 30.81 kg/m      PHYSICAL EXAM    Constitutional: Well developed, well nourished. Uncomfortable appearing and diaphoreses.   HENT: Erythema tonsil bilaterally, no exudate or uvula shift. Normocephalic, atraumatic, mucous membranes moist, nose normal. Neck- Supple, gross ROM intact.   Eyes: Pupils mid-range, conjunctiva without injection, no discharge.   Respiratory: Clear to auscultation bilaterally, no respiratory distress, no wheezing, speaks full sentences easily. No cough.  Cardiovascular: Normal heart rate, regular rhythm, no murmurs.   GI: Soft, no tenderness to deep palpation in all quadrants, no masses.  Musculoskeletal: Moving all 4 extremities intentionally and without pain. No obvious deformity.  Skin: Warm, dry, no rash.  Neurologic: Alert & oriented x 3, cranial nerves grossly intact.  Psychiatric: Affect normal, cooperative.        ILillie Her am serving as a scribe to document services personally performed by Dr. John Huggins based on my observation and the provider's statements to me. IJohn MD attest that Lillie Her is acting in a scribe capacity, has observed my performance of the services and has documented them in accordance with my direction.    John Huggins M.D.  Emergency Medicine  [facility] EMERGENCY DEPARTMENT  [address] [id number] [phone]  Dept: [phone]      John Huggins MD  07/31/23 1423    "